# Patient Record
Sex: FEMALE | Race: BLACK OR AFRICAN AMERICAN | NOT HISPANIC OR LATINO | URBAN - METROPOLITAN AREA
[De-identification: names, ages, dates, MRNs, and addresses within clinical notes are randomized per-mention and may not be internally consistent; named-entity substitution may affect disease eponyms.]

---
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
## 2022-04-30 NOTE — ED PEDIATRIC NURSE NOTE - NS_NURSE_DISC_TEACHING_YN_ED_ALL_ED
You can access the FollowMyHealth Patient Portal offered by [facility] by registering at the following website: [email or web address]. By joining [facility]’s FollowMyHealth portal, you will also be able to view your health information using other applications (apps) compatible with our system.
No

## 2022-11-21 ENCOUNTER — NON-APPOINTMENT (OUTPATIENT)
Age: 22
End: 2022-11-21

## 2022-12-12 ENCOUNTER — APPOINTMENT (OUTPATIENT)
Dept: ORTHOPEDIC SURGERY | Facility: CLINIC | Age: 22
End: 2022-12-12

## 2022-12-12 DIAGNOSIS — S61.551A OPEN BITE OF RIGHT WRIST, INITIAL ENCOUNTER: ICD-10-CM
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
## 2022-12-12 PROCEDURE — 99203 OFFICE O/P NEW LOW 30 MIN: CPT

## 2022-12-12 PROCEDURE — 73110 X-RAY EXAM OF WRIST: CPT | Mod: RT

## 2022-12-12 NOTE — ASSESSMENT
[FreeTextEntry1] : The patient was advised of the diagnosis. The natural history of the pathology was explained in full to the patient in layman's terms. All questions were answered. The risks and benefits of surgical and non-surgical treatment alternatives were explained in full to the patient.\par \par

## 2022-12-12 NOTE — HISTORY OF PRESENT ILLNESS
[de-identified] : attacked by her cat 2 weeks ago while it was trying to mate- patient describes exposed bone R wrist.\par rec'd tetanus and abx\par RHD, works in [employer] \par

## 2022-12-12 NOTE — IMAGING
[Right] : right wrist [There are no fractures, subluxations or dislocations. No significant abnormalities are seen] : There are no fractures, subluxations or dislocations. No significant abnormalities are seen [No acute displaced fracture or dislocation] : No acute displaced fracture or dislocation [de-identified] : right hand- no swelling\par scattered healing scarsto the wrist\par farom- wrist flex/ex/sup/pronation\par NVID

## 2024-07-01 ENCOUNTER — NON-APPOINTMENT (OUTPATIENT)
Age: 24
End: 2024-07-01

## 2025-01-08 ENCOUNTER — EMERGENCY (EMERGENCY)
Facility: HOSPITAL | Age: 25
LOS: 1 days | Discharge: HOME | End: 2025-01-08
Attending: EMERGENCY MEDICINE
Payer: COMMERCIAL

## 2025-01-08 VITALS
DIASTOLIC BLOOD PRESSURE: 74 MMHG | SYSTOLIC BLOOD PRESSURE: 114 MMHG | TEMPERATURE: 98 F | OXYGEN SATURATION: 97 % | RESPIRATION RATE: 20 BRPM | HEART RATE: 88 BPM | WEIGHT: 98.11 LBS | HEIGHT: 60 IN

## 2025-01-08 VITALS
OXYGEN SATURATION: 98 % | HEART RATE: 79 BPM | TEMPERATURE: 98 F | RESPIRATION RATE: 16 BRPM | DIASTOLIC BLOOD PRESSURE: 77 MMHG | SYSTOLIC BLOOD PRESSURE: 112 MMHG

## 2025-01-08 PROCEDURE — 73140 X-RAY EXAM OF FINGER(S): CPT | Mod: 26,RT

## 2025-01-08 PROCEDURE — 12002 RPR S/N/AX/GEN/TRNK2.6-7.5CM: CPT

## 2025-01-08 PROCEDURE — 99284 EMERGENCY DEPT VISIT MOD MDM: CPT | Mod: 25

## 2025-01-08 PROCEDURE — 73140 X-RAY EXAM OF FINGER(S): CPT

## 2025-01-08 RX ORDER — IBUPROFEN 200 MG
400 TABLET ORAL ONCE
Refills: 0 | Status: COMPLETED | OUTPATIENT
Start: 2025-01-08 | End: 2025-01-08

## 2025-01-08 RX ORDER — ACETAMINOPHEN 500 MG/5ML
650 LIQUID (ML) ORAL ONCE
Refills: 0 | Status: COMPLETED | OUTPATIENT
Start: 2025-01-08 | End: 2025-01-08

## 2025-01-08 RX ORDER — LIDOCAINE HCL/PF 10 MG/ML
10 VIAL (ML) INJECTION ONCE
Refills: 0 | Status: COMPLETED | OUTPATIENT
Start: 2025-01-08 | End: 2025-01-08

## 2025-01-08 RX ADMIN — Medication 10 MILLILITER(S): at 18:35

## 2025-01-08 RX ADMIN — Medication 650 MILLIGRAM(S): at 18:04

## 2025-01-08 RX ADMIN — Medication 400 MILLIGRAM(S): at 18:04

## 2025-01-15 ENCOUNTER — EMERGENCY (EMERGENCY)
Facility: HOSPITAL | Age: 25
LOS: 1 days | Discharge: HOME | End: 2025-01-15
Attending: EMERGENCY MEDICINE
Payer: COMMERCIAL

## 2025-01-15 VITALS
DIASTOLIC BLOOD PRESSURE: 74 MMHG | TEMPERATURE: 98 F | OXYGEN SATURATION: 99 % | SYSTOLIC BLOOD PRESSURE: 106 MMHG | HEIGHT: 60 IN | HEART RATE: 98 BPM | RESPIRATION RATE: 16 BRPM | WEIGHT: 100.09 LBS

## 2025-01-15 VITALS
RESPIRATION RATE: 16 BRPM | HEART RATE: 91 BPM | SYSTOLIC BLOOD PRESSURE: 100 MMHG | TEMPERATURE: 98 F | OXYGEN SATURATION: 100 % | DIASTOLIC BLOOD PRESSURE: 73 MMHG

## 2025-01-15 PROCEDURE — L9995: CPT

## 2025-01-15 PROCEDURE — G0463: CPT

## 2025-01-19 ENCOUNTER — EMERGENCY (EMERGENCY)
Facility: HOSPITAL | Age: 25
LOS: 1 days | Discharge: HOME | End: 2025-01-19
Attending: EMERGENCY MEDICINE
Payer: COMMERCIAL

## 2025-01-19 VITALS
HEART RATE: 80 BPM | SYSTOLIC BLOOD PRESSURE: 103 MMHG | OXYGEN SATURATION: 99 % | RESPIRATION RATE: 15 BRPM | DIASTOLIC BLOOD PRESSURE: 73 MMHG | TEMPERATURE: 97 F | HEIGHT: 60 IN

## 2025-01-19 PROCEDURE — G0463: CPT

## 2025-01-19 PROCEDURE — 99283 EMERGENCY DEPT VISIT LOW MDM: CPT

## 2025-01-19 NOTE — ED PROVIDER NOTE - PROGRESS NOTE DETAILS
Attending MD Vasquez: Advised to follow up with PMD, has appointment on Friday.  Stable for discharge. Follow up instructions given, importance of follow up emphasized, return to ED parameters reviewed and patient verbalized understanding.  All questions answered, all concerns addressed.

## 2025-01-19 NOTE — ED ADULT NURSE NOTE - EXTENSIONS OF SELF_ADULT
PLEASE NOTE DR BOO'S OFFICE HOURS ARE AS FOLLOW:     MON  WE ARE OUT OF THE OFFICE     TUES  8:00 A.M. TO 4:30 P.M.     WED  8:00 A.M. TO 4:30 P.M.     THURS 9:00 A.M. TO 4:30 P.M.     FRI  8:00 A.M. TO 3:00 P.M.          IF YOU CALL OUR OFFICE FOR A MEDICAL REASON OR A MED REFILL AFTER FRIDAY 1:00 P.M, YOU WILL NOT RECEIVE AN ANSWER OR REFILL UNTIL THE FOLLOWING TUESDAY. (we are out of the office Saturday, Sunday and Monday)      IF IT IS AN URGENT MESSAGE, WE DO HAVE A TRIAGE NURSE AND AN MD ON CALL.      OFFICE PHONE NUMBER: [phone]  OFFICE FAX NUMBER: [phone]        MEDICATION REFILLS:   If you need a refill of your medication, please contact your pharmacy FIRST. You may have refills on file with them, if not, they will contact our office to refill the prescription on your behalf. Thank you. In the next few weeks you may receive a [employer] survey regarding your most recent clinic visit with us.  Please take a few moments out of your day to accurately evaluate your visit.  We strive to provide you with the best medical care.  Again, thank you for your time and we look forward to your next visit.         If we need to contact you regarding any test results, we will make 2 attempts to reach you at the number you have listed during your office visit today. If we are unable to reach you, a letter with your results and any further instructions will be mailed to you home.         None

## 2025-01-19 NOTE — ED PROVIDER NOTE - ATTENDING APP SHARED VISIT CONTRIBUTION OF CARE
Attending MD Vasquez:   I personally have seen and examined this patient.  Physician assistant note reviewed and agree on plan of care and except where noted.  See below for details.     Seen in [facility]    25F with no reported contributory PMH/PSH/Meds, no known drug allergies presents to the ED for suture removal of the sutures placed on 1/8/25.  Reports is able to range finger but reports feels pain with flexion.  Denies purulence, bleeding, fevers, chills, redness.    Exam:   General: NAD  HENT: head NCAT, airway patent   Chest: symmetric chest rise, no increased work of breathing  MSK: ranging neck freely, able to extend fully and almost full flexion at MCP/PIP/DIP, no erythema, no area of fluctuance or induration, cap refill <2s, wound appears well approximated  Neuro: moving all extremities spontaneously, sensory grossly intact, no gross neuro deficits  Psych: normal mood and affect     A/P: 25F here for suture removal, will remove

## 2025-01-19 NOTE — ED PROCEDURE NOTE - PROCEDURE NAME, MLM
[FreeTextEntry1] : Location: medial aspect of left foot\par Past Tx:seen by her son EMT, who referred her to her PCP and subsequent notified by Dr Chamberlain who requested urgent visit\par \par \par  Suture/Staple Removal

## 2025-01-19 NOTE — ED PROVIDER NOTE - PHYSICAL EXAMINATION
Gen: Well appearing, AAO x 3, NAD  Eyes: PERRLA  Ext:  Right middle finger with sutured V-shaped laceration over the dorsal aspect of the middle phalanx,  no overlying erythema, warmth, induration, fluctuance or discharge. Neurovascularly intact distally with 5/5 strength, normal sensation, equal pulses and brisk capillary refill.  Skin: +sutured wound to R middle finger as above. No pus/drainage.  No rashes or lesions  Neuro: no focal deficits, sensation intact.

## 2025-01-19 NOTE — ED PROVIDER NOTE - OBJECTIVE STATEMENT
25-year-old female with no reported PMhx presents ED for suture removal.  Sustained laceration to right third finger on 1/8/2025, subsequently repaired in ED, returned on 1/15/2025 for suture removal, at that time it was deemed that sutures required more time and was told to come back in 3-5 days.  Patient presents again today for suture removal.  Still endorsing some mild pain to the finger but feels it has been healing well.  Denies fever/chills, pus/discharge from wound, overlying skin changes, numbness/tingling.

## 2025-01-19 NOTE — ED PROVIDER NOTE - NSFOLLOWUPINSTRUCTIONS_ED_ALL_ED_FT
Follow up with your primary doctor this Friday as scheduled for re-evaluation. Discuss possible PT/OT with your primary doctor.    Take Tylenol 650 mg every 6 hours as needed for pain. Take Motrin 600 mg every 8 hours with food for additional relief.     Return to the Emergency Department immediately if you develop any new/worsening symptoms including fever/chills, numbness/tingling, drainage/discharge from the wound, increasing pain, inability to range the finger or any other concerns. Follow up with your primary doctor this Friday as scheduled for re-evaluation. Discuss possible PT/OT with your primary doctor.    Take Tylenol 650 mg every 6 hours as needed for pain. Take Motrin 600 mg every 8 hours with food for additional relief.     You may apply bacitracin to the wound twice daily.    Return to the Emergency Department immediately if you develop any new/worsening symptoms including fever/chills, numbness/tingling, drainage/discharge from the wound, increasing pain, inability to range the finger or any other concerns.

## 2025-01-19 NOTE — ED ADULT NURSE NOTE - OBJECTIVE STATEMENT
25y F presents to the ED for suture removal. Pt presents with 12 sutures R third finger. Sutures placed on 1/8/25. Pt returned to ED 1/15 for suture removal, but sutures were not ready for removal. Pt endorsing some mild pain to finger, but feels it is healing well. JUDI Edmondson at bedside. Comfort and safety maintained.